# Patient Record
Sex: FEMALE | URBAN - METROPOLITAN AREA
[De-identification: names, ages, dates, MRNs, and addresses within clinical notes are randomized per-mention and may not be internally consistent; named-entity substitution may affect disease eponyms.]

---

## 2024-02-05 ENCOUNTER — APPOINTMENT (RX ONLY)
Dept: URBAN - METROPOLITAN AREA CLINIC 53 | Facility: CLINIC | Age: 49
Setting detail: DERMATOLOGY
End: 2024-02-05

## 2024-02-05 DIAGNOSIS — Z41.9 ENCOUNTER FOR PROCEDURE FOR PURPOSES OTHER THAN REMEDYING HEALTH STATE, UNSPECIFIED: ICD-10-CM

## 2024-02-05 PROCEDURE — ? DELUXE HYDRAFACIAL

## 2024-02-05 PROCEDURE — ? COSMETIC CONSULTATION: HYDRAFACIAL

## 2024-02-05 PROCEDURE — ? ADDITIONAL NOTES

## 2024-02-05 PROCEDURE — ? COSMETIC QUOTE

## 2024-02-05 PROCEDURE — ? RECOMMENDATIONS

## 2024-02-05 ASSESSMENT — LOCATION SIMPLE DESCRIPTION DERM: LOCATION SIMPLE: INFERIOR FOREHEAD

## 2024-02-05 ASSESSMENT — LOCATION ZONE DERM: LOCATION ZONE: FACE

## 2024-02-05 ASSESSMENT — LOCATION DETAILED DESCRIPTION DERM: LOCATION DETAILED: INFERIOR MID FOREHEAD

## 2024-02-05 NOTE — PROCEDURE: ADDITIONAL NOTES
Detail Level: Zone
Additional Notes: Patient was gifted a Hydrafacial Deluxe from her friend.  Patient’s daughter is getting  on Saturday.
Render Risk Assessment In Note?: no
Additional Notes: Patient to schedule a Marnie IPL Consult.

## 2024-02-05 NOTE — PROCEDURE: RECOMMENDATIONS
Recommendations (Free Text): Hydrafacial Deluxe
Render Risk Assessment In Note?: no
Detail Level: Zone
Recommendation Preamble: The following recommendations were made during the visit:

## 2024-02-05 NOTE — PROCEDURE: COSMETIC QUOTE
Laser 12 Price/Unit (In Dollars- Use Only Numbers And Decimals): 0.00
Implant 4 Units: 0
Body Procedure 4 Price/Unit (In Dollars- Use Only Numbers And Decimals): 350
Misc Procedure 7 Price/Unit (In Dollars- Use Only Numbers And Decimals): 98
Misc Procedure 3 Price/Unit (In Dollars- Use Only Numbers And Decimals): 130.0
Laser 4: Post care Products
Face Procedure 6: Benign Destruction
Face Procedure 9: clinical Facial with extractions
Laser 1: Venus IPL Face
Laser 7: Venus IPL neck add on
Use Name For Above Discounts: No
Face Procedure 3: Hydrafacial Platinum
Body Procedure 1: Back clinical Facial
Body Procedure 5: VI Peel Body small area
Misc Procedure 8: Proscriptix Densifi Fx Volumizing Condotioner
Misc Procedure 4: ZO Pigment control crème
Misc Procedure 5: Proscriptix Activ Balancing Cleanser
Face Procedure 4: Anti-aging Clinical facial with milia extractions.
Body Procedure 2: Hydrafacial Keravive Full Scalp with Peptide spray
Face Procedure 7: Tretinoin.05%
Laser 2: Venus Viva Nanofractional
Face Procedure 1: Hydrafacial Deluxe
Body Procedure 6: Hydrafacial Back acne
Laser 5: Venus Viva Nanofractional Perioral
Misc Procedure 9: Proscriptix Pro-Pil Fx
Detail Level: Zone
Face Procedure 1 Units: 4
Misc Procedure 6: Proscriptix Calm and Correct Phyto serum
Misc Procedure 2: Proscriptix Retinols Renewal 2.5X
Face Procedure 8 Price/Unit (In Dollars- Use Only Numbers And Decimals): 65
Face Procedure 2 Price/Unit (In Dollars- Use Only Numbers And Decimals): 389
Body Procedure 7 Price/Unit (In Dollars- Use Only Numbers And Decimals): 329.00
Body Procedure 3 Price/Unit (In Dollars- Use Only Numbers And Decimals): 250
Misc Procedure 3: ZO Skin Brighter
Misc Procedure 7: ZO 10% Vitamin C Self-Activating
Face Procedure 6 Price/Unit (In Dollars- Use Only Numbers And Decimals): 150
Laser 4 Price/Unit (In Dollars- Use Only Numbers And Decimals): 50
Number Of Months: 1
Laser 7 Price/Unit (In Dollars- Use Only Numbers And Decimals): 100
Misc Procedure 4 Price/Unit (In Dollars- Use Only Numbers And Decimals): 77
Misc Procedure 8 Price/Unit (In Dollars- Use Only Numbers And Decimals): 28.00
Face Procedure 10: extractions
Misc Procedure 1 Price/Unit (In Dollars- Use Only Numbers And Decimals): 12.00
Face Procedure 10 Navarro/Unit (In Dollars- Use Only Numbers And Decimals): 75.00
Laser 2 Price/Unit (In Dollars- Use Only Numbers And Decimals): 450
Body Procedure 2 Price/Unit (In Dollars- Use Only Numbers And Decimals): 399.00
Body Procedure 6 Price/Unit (In Dollars- Use Only Numbers And Decimals): 200.00
Face Procedure 1 Price/Unit (In Dollars- Use Only Numbers And Decimals): 265.00
Misc Procedure 9 Price/Unit (In Dollars- Use Only Numbers And Decimals): 46.00
Include Sales Tax On Surgeon's Fees: Yes
Misc Procedure 2 Price/Unit (In Dollars- Use Only Numbers And Decimals): 109.20
Misc Procedure 6 Price/Unit (In Dollars- Use Only Numbers And Decimals): 66.15
Face Procedure 5: SkinPen Microneedling Face
Body Procedure 3: microneedling left axilla
Face Procedure 8: Hydrafacial eye Perk add on
Body Procedure 7: SkinPen Microneedling Neck
Laser 6: Venus Viva Nanofractional Eyes
Laser 3: Venus IPL Chest
Face Procedure 2: VI Peel Precision Plus
Body Procedure 4: VI Peel Body Chest

## 2024-02-05 NOTE — PROCEDURE: DELUXE HYDRAFACIAL
Price (Use Numbers Only, No Special Characters Or $): 199
Post-Care Instructions: I reviewed with the patient in detail post-care instructions. Patient should stay away from the sun and wear sun protection until treated areas are fully healed.
Vacuum Pressure Low Setting (Will Not Render If Set To 0): 13
Procedure: Peel
Vacuum Pressure High Setting (Will Not Render If Set To 0): 0
Tip: Hydropeel Tip, Clear
Treatment Number: 1
Solution: Beta-HD
Solution Override
Indication: anti-aging
Tip: Hydropeel Tip, Blue
Vacuum Pressure Low Setting (Will Not Render If Set To 0): 22
Procedure: Boost
Solution: GlySal 7.5%
Procedure: Exfoliation
Vacuum Pressure Low Setting (Will Not Render If Set To 0): 16
Procedure: Extend and Protect
Tip Override
Location: face
Solution: Antiox-6
Solution Override: Glysal peel
Tip: Hydropeel Tip, Teal
Solution: Activ-4
Procedure: Extraction
Procedure: Fusion
Consent: Written consent obtained, risks reviewed including but not limited to crusting, scabbing, blistering, scarring, darker or lighter pigmentary change, bruising, and/or incomplete response.

## 2024-10-07 ENCOUNTER — APPOINTMENT (RX ONLY)
Dept: URBAN - METROPOLITAN AREA CLINIC 54 | Facility: CLINIC | Age: 49
Setting detail: DERMATOLOGY
End: 2024-10-07

## 2024-10-07 ENCOUNTER — RX ONLY (OUTPATIENT)
Age: 49
Setting detail: RX ONLY
End: 2024-10-07

## 2024-10-07 DIAGNOSIS — Z41.9 ENCOUNTER FOR PROCEDURE FOR PURPOSES OTHER THAN REMEDYING HEALTH STATE, UNSPECIFIED: ICD-10-CM

## 2024-10-07 PROCEDURE — ? BOTOX (U OR CC)

## 2024-10-07 RX ORDER — CLINDAMYCIN PHOSPHATE 10 MG/ML
SOLUTION TOPICAL
Qty: 60 | Refills: 7 | Status: ERX | COMMUNITY
Start: 2024-10-07

## 2024-10-07 ASSESSMENT — LOCATION ZONE DERM: LOCATION ZONE: FACE

## 2024-10-07 ASSESSMENT — LOCATION DETAILED DESCRIPTION DERM
LOCATION DETAILED: RIGHT INFERIOR MEDIAL FOREHEAD
LOCATION DETAILED: LEFT MID TEMPLE

## 2024-10-07 ASSESSMENT — LOCATION SIMPLE DESCRIPTION DERM
LOCATION SIMPLE: LEFT TEMPLE
LOCATION SIMPLE: RIGHT FOREHEAD

## 2024-10-07 NOTE — PROCEDURE: BOTOX (U OR CC)
Detail Level: Detailed
Measure In Units Or Cc's?: units
Post-Care Instructions: Patient instructed to not lie down for 4 hours and limit physical activity for 24 hours. Patient instructed not to travel by airplane for 48 hours.
Price Per Unit Or Per Cc In $ (Use Numbers Only, No Special Characters Or $): 13.00
Dilution (U/0.1 Cc): 2
Consent: Written consent obtained. Risks include but not limited to lid/brow ptosis, bruising, swelling, diplopia, temporary effect, incomplete chemical denervation.
Quantity Per Injection Site (Units Or Cc): 50
Quantity Per Injection Site (Units Or Cc): 8

## 2024-10-23 ENCOUNTER — APPOINTMENT (RX ONLY)
Dept: URBAN - METROPOLITAN AREA CLINIC 54 | Facility: CLINIC | Age: 49
Setting detail: DERMATOLOGY
End: 2024-10-23

## 2024-10-23 DIAGNOSIS — Z41.9 ENCOUNTER FOR PROCEDURE FOR PURPOSES OTHER THAN REMEDYING HEALTH STATE, UNSPECIFIED: ICD-10-CM

## 2024-10-23 PROCEDURE — ? COSMETIC FOLLOW-UP

## 2024-10-23 PROCEDURE — ? OTHER

## 2024-10-23 NOTE — PROCEDURE: OTHER
Other (Free Text): Free 2 units done to right eyebrow
Detail Level: Zone
Note Text (......Xxx Chief Complaint.): This diagnosis correlates with the
Render Risk Assessment In Note?: no

## 2025-01-20 ENCOUNTER — APPOINTMENT (OUTPATIENT)
Dept: URBAN - METROPOLITAN AREA CLINIC 54 | Facility: CLINIC | Age: 50
Setting detail: DERMATOLOGY
End: 2025-01-20

## 2025-01-20 DIAGNOSIS — Z41.9 ENCOUNTER FOR PROCEDURE FOR PURPOSES OTHER THAN REMEDYING HEALTH STATE, UNSPECIFIED: ICD-10-CM

## 2025-01-20 PROCEDURE — ? BOTOX (U OR CC)

## 2025-01-20 ASSESSMENT — LOCATION DETAILED DESCRIPTION DERM
LOCATION DETAILED: LEFT MID TEMPLE
LOCATION DETAILED: RIGHT INFERIOR MEDIAL FOREHEAD

## 2025-01-20 ASSESSMENT — LOCATION SIMPLE DESCRIPTION DERM
LOCATION SIMPLE: LEFT TEMPLE
LOCATION SIMPLE: RIGHT FOREHEAD

## 2025-01-20 ASSESSMENT — LOCATION ZONE DERM: LOCATION ZONE: FACE

## 2025-01-20 NOTE — PROCEDURE: BOTOX (U OR CC)
Detail Level: Detailed
Measure In Units Or Cc's?: units
Post-Care Instructions: Patient instructed to not lie down for 4 hours and limit physical activity for 24 hours. Patient instructed not to travel by airplane for 48 hours.
Lot #: [id number]
Expiration Date (Month Year): 2026/11
Price Per Unit Or Per Cc In $ (Use Numbers Only, No Special Characters Or $): 13.00
Dilution (U/0.1 Cc): 2
Consent: Written consent obtained. Risks include but not limited to lid/brow ptosis, bruising, swelling, diplopia, temporary effect, incomplete chemical denervation.
Quantity Per Injection Site (Units Or Cc): 50
Quantity Per Injection Site (Units Or Cc): 8

## 2025-03-26 ENCOUNTER — APPOINTMENT (OUTPATIENT)
Dept: URBAN - METROPOLITAN AREA CLINIC 54 | Facility: CLINIC | Age: 50
Setting detail: DERMATOLOGY
End: 2025-03-26

## 2025-03-26 DIAGNOSIS — L57.0 ACTINIC KERATOSIS: ICD-10-CM

## 2025-03-26 DIAGNOSIS — Z41.9 ENCOUNTER FOR PROCEDURE FOR PURPOSES OTHER THAN REMEDYING HEALTH STATE, UNSPECIFIED: ICD-10-CM

## 2025-03-26 PROCEDURE — ? BOTOX (U OR CC)

## 2025-03-26 PROCEDURE — 17000 DESTRUCT PREMALG LESION: CPT

## 2025-03-26 PROCEDURE — ? LIQUID NITROGEN

## 2025-03-26 ASSESSMENT — LOCATION SIMPLE DESCRIPTION DERM
LOCATION SIMPLE: RIGHT FOREHEAD
LOCATION SIMPLE: LEFT TEMPLE
LOCATION SIMPLE: NOSE

## 2025-03-26 ASSESSMENT — LOCATION ZONE DERM
LOCATION ZONE: NOSE
LOCATION ZONE: FACE

## 2025-03-26 ASSESSMENT — LOCATION DETAILED DESCRIPTION DERM
LOCATION DETAILED: NASAL DORSUM
LOCATION DETAILED: RIGHT INFERIOR MEDIAL FOREHEAD
LOCATION DETAILED: LEFT MID TEMPLE

## 2025-03-26 NOTE — PROCEDURE: BOTOX (U OR CC)
Detail Level: Detailed
Measure In Units Or Cc's?: units
Post-Care Instructions: Patient instructed to not lie down for 4 hours and limit physical activity for 24 hours. Patient instructed not to travel by airplane for 48 hours.
Lot #: [id number]
Expiration Date (Month Year): 2026/11
Price Per Unit Or Per Cc In $ (Use Numbers Only, No Special Characters Or $): 13.00
Dilution (U/0.1 Cc): 2
Consent: Written consent obtained. Risks include but not limited to lid/brow ptosis, bruising, swelling, diplopia, temporary effect, incomplete chemical denervation.
Quantity Per Injection Site (Units Or Cc): 50
Quantity Per Injection Site (Units Or Cc): 8

## 2025-05-19 ENCOUNTER — APPOINTMENT (OUTPATIENT)
Dept: URBAN - METROPOLITAN AREA CLINIC 54 | Facility: CLINIC | Age: 50
Setting detail: DERMATOLOGY
End: 2025-05-19

## 2025-05-19 DIAGNOSIS — Z41.9 ENCOUNTER FOR PROCEDURE FOR PURPOSES OTHER THAN REMEDYING HEALTH STATE, UNSPECIFIED: ICD-10-CM

## 2025-05-19 PROCEDURE — ? BOTOX (U OR CC)

## 2025-05-19 ASSESSMENT — LOCATION DETAILED DESCRIPTION DERM
LOCATION DETAILED: RIGHT INFERIOR MEDIAL FOREHEAD
LOCATION DETAILED: LEFT MID TEMPLE
LOCATION DETAILED: RIGHT LATERAL FOREHEAD

## 2025-05-19 ASSESSMENT — LOCATION SIMPLE DESCRIPTION DERM
LOCATION SIMPLE: LEFT TEMPLE
LOCATION SIMPLE: RIGHT FOREHEAD

## 2025-05-19 ASSESSMENT — LOCATION ZONE DERM: LOCATION ZONE: FACE

## 2025-05-19 NOTE — PROCEDURE: BOTOX (U OR CC)
Detail Level: Detailed
Measure In Units Or Cc's?: units
Post-Care Instructions: Patient instructed to not lie down for 4 hours and limit physical activity for 24 hours. Patient instructed not to travel by airplane for 48 hours.
Lot #: [id number]
Expiration Date (Month Year): 2026/11
Price Per Unit Or Per Cc In $ (Use Numbers Only, No Special Characters Or $): 13.00
Dilution (U/0.1 Cc): 2
Consent: Written consent obtained. Risks include but not limited to lid/brow ptosis, bruising, swelling, diplopia, temporary effect, incomplete chemical denervation.
Quantity Per Injection Site (Units Or Cc): 50
Quantity Per Injection Site (Units Or Cc): 8